# Patient Record
Sex: MALE | Race: BLACK OR AFRICAN AMERICAN | URBAN - METROPOLITAN AREA
[De-identification: names, ages, dates, MRNs, and addresses within clinical notes are randomized per-mention and may not be internally consistent; named-entity substitution may affect disease eponyms.]

---

## 2018-07-06 ENCOUNTER — OFFICE VISIT (OUTPATIENT)
Dept: OTOLARYNGOLOGY | Facility: CLINIC | Age: 28
End: 2018-07-06

## 2018-07-06 VITALS
DIASTOLIC BLOOD PRESSURE: 59 MMHG | BODY MASS INDEX: 37.8 KG/M2 | TEMPERATURE: 98 F | HEIGHT: 71 IN | WEIGHT: 270 LBS | SYSTOLIC BLOOD PRESSURE: 91 MMHG

## 2018-07-06 DIAGNOSIS — R22.1 MASS OF LEFT SIDE OF NECK: Primary | ICD-10-CM

## 2018-07-06 PROCEDURE — 99203 OFFICE O/P NEW LOW 30 MIN: CPT | Performed by: OTOLARYNGOLOGY

## 2018-07-06 PROCEDURE — 99212 OFFICE O/P EST SF 10 MIN: CPT | Performed by: OTOLARYNGOLOGY

## 2018-07-06 RX ORDER — ARIPIPRAZOLE 15 MG/1
TABLET ORAL
Refills: 1 | COMMUNITY
Start: 2018-06-20

## 2018-07-06 RX ORDER — ARIPIPRAZOLE 2 MG/1
TABLET ORAL
Refills: 1 | COMMUNITY
Start: 2018-05-03 | End: 2018-07-06

## 2018-07-06 RX ORDER — FLUOCINOLONE ACETONIDE 0.11 MG/ML
OIL AURICULAR (OTIC)
Refills: 1 | COMMUNITY
Start: 2018-07-03

## 2018-07-06 NOTE — PROGRESS NOTES
Lucy Jack is a 34year old male.   Patient presents with:  Swelling: left side of neck, palpable lump on left side       HISTORY OF PRESENT ILLNESS  He presents with a history of seeing his regular doctor for routine physical with a finding of a possible Parotid gland - Normal. Thyroid gland -large left hemithyroid?    Eyes Normal Conjunctiva - Right: Normal, Left: Normal. Pupil - Right: Normal, Left: Normal. Fundus - Right: Normal, Left: Normal.   Neurological Normal Memory - Normal. Cranial nerves - Crani

## (undated) NOTE — LETTER
[facility], [address]       07/06/18        Patient: Trudy Marti   YOB: 1990   Date of Visit: 7/6/2018       Dear  Dr. Otf Eubanks MD,      Thank you for referring Trudy Marti to my practice.   Please fin